# Patient Record
Sex: FEMALE | Race: OTHER | NOT HISPANIC OR LATINO | URBAN - METROPOLITAN AREA
[De-identification: names, ages, dates, MRNs, and addresses within clinical notes are randomized per-mention and may not be internally consistent; named-entity substitution may affect disease eponyms.]

---

## 2024-04-29 NOTE — ASU PATIENT PROFILE, ADULT - FALL HARM RISK - UNIVERSAL INTERVENTIONS
Bed in lowest position, wheels locked, appropriate side rails in place/Call bell, personal items and telephone in reach/Instruct patient to call for assistance before getting out of bed or chair/Non-slip footwear when patient is out of bed/[place] to call system/Physically safe environment - no spills, clutter or unnecessary equipment/Purposeful Proactive Rounding/Room/bathroom lighting operational, light cord in reach

## 2024-04-29 NOTE — ASU PATIENT PROFILE, ADULT - BLOOD AVOIDANCE/RESTRICTIONS, PROFILE
Occupational Therapy Visit    Visit Count: 5    Insurance Information: Medicare **Also receiving occupational therapy for other diagnosis**                Referred by: Susan Barron MD;   Medical Diagnosis (from order):         Diagnosis Information                Diagnosis     596.51 (ICD-9-CM) - N32.81 (ICD-10-CM) - OAB (overactive bladder)788.30   (ICD-9-CM) - R32 (ICD-10-CM) - Incontinence   Episode created from referral [id number]               Treatment Diagnosis: Pelvic floor dysfunction with increased pain/symptoms, impaired posture, impaired strength, impaired activity tolerance, impaired motor function/performance/coordination, impaired body mechanics, impaired bladder health, impaired bowel health, urinary incontinence     Diagnosis Precautions: History of a stroke    SUBJECTIVE   Patient states she has \"good days and bad days. Seem to be doing a little bit better, when I get up, I'm doing the breathing, and waiting a little bit, (do the little gentle contraction), and then it settles down\"  Current Pain (0-10 scale): 0/10      OBJECTIVE   Verbal informed consent was received today from patient for external pelvic floor muscle assessment and treatment.   Educated patient on the different types of pelvic floor muscle assessments = internal, external, biofeedback  • Educated patient on the benefits and risks of each examination technique  • Based on discussion with patient, patient choosing to proceed with external assessment     Outcome/Assessments  Patient Specific Functional Scale:   Activity: Be able to sneeze, cough, walk to the bathroom without leakage, Score: 3  Activity: Decrease use of a pad only (no Depend) for leaking , Score: 1  Activity: Wake 1x or less to void at night, Score: 2  Average Score: 3  Each activity is scored: 0=unable to perform activity to 10=able to perform activity at the same level as before injury or problem    Treatment   Therapeutic Exercise:   Instructed in functional  pelvic floor exercise program:  · Seated Diaphragmatic Breathing with Pelvic Floor Lengthening  - 10 reps  · Seated Pelvic Floor Contract and Release- 10 reps  · Seated Pelvic Floor Stretch - 2 reps - 30 hold  · Seated Piriformis Stretch with Foot on Ground - 2 reps - 30 hold  · Seated Pelvic Floor Contract/Relax - 10 reps  · Seated Pelvic Floor Contraction with Isometric Hip Adduction - 10 reps  · Standing Pelvic Floor Contract/Relax - 10 reps  · Sit to Stand with Pelvic Floor Contraction - 2 reps    Neuromuscular Reeducation:  Discussion with patient regarding use of one hand for self- catheterization due to questions regarding post Botox injection possible side effects of temporary inability to empty the bladder. Discussed one handed techniques for stabilizing hand if this would be needed.   Patient demonstrated the following strategies for bladder emptying:  Initial emptying/void, stand up, move around then sit down again on the toilet and try to urinate again.  Leaning forward, placing elbows onto knees.  Leaning forward (and rocking) to promote urination.  After finished passing urine, squeeze the pelvic floor muscle and then relax it, to try and completely empty, x5  Tapping over the bladder to assist in triggering a contraction    Stroking or tickling the lower back to stimulate urination      Skilled input: verbal instruction/cues, tactile instruction/cues, posture correction, facilitation, inhibition    Home Program:   Ther ex, bladder emptying, relaxation stretches    Writer verbally educated the patient and received verbal consent from the patient on hand placement, positioning of patient, and techniques to be performed today including clothing adjustments for techniques, therapist position for techniques, hand placement and palpation for techniques as described above and how they are pertinent to the patient's plan of care.       Suggestions for next session as indicated: progress per plan of  care    ASSESSMENT   Patient with good activity tolerance for today's session. Patient with improved coordination of PFM with accessory muscles and breathing diaphragm today. Patient demonstrated good comprehension of instructions & rationale. Agreeable to all Home Program recommendations. Patient will continue to benefit from skilled occupational therapy to progress to baseline with pelvic floor health.   Pain after treatment (patient reported, 0-10 scale): 0/10  Result of above outlined education: Verbalizes understanding and Needs reinforcement    Therapy procedure time and total treatment time can be found documented on the Time Entry flowsheet   none

## 2024-04-29 NOTE — ASU PATIENT PROFILE, ADULT - NSICDXPASTSURGICALHX_GEN_ALL_CORE_FT
PAST SURGICAL HISTORY:  No significant past surgical history PAST SURGICAL HISTORY:  History of elective

## 2024-04-29 NOTE — ASU PATIENT PROFILE, ADULT - NS PREOP UNDERSTANDS INFO
Spoke to patient to be NPO/NO solid foods after  12Mn, allow to drink water or apple juice till  4-6 am , dress comfortable, leave all valuable at home, no lotions, no jewelry, Bring ID photo and insurance cards,  escort arranged, address and telephone given to  patient ./yes

## 2024-04-29 NOTE — ASU PATIENT PROFILE, ADULT - BILL OF RIGHTS/ADMISSION INFORMATION PROVIDED TO:
Patient
CCHD Screen [03-18]: Initial  Pre-Ductal SpO2(%): 98  Post-Ductal SpO2(%): 95  SpO2 Difference(Pre MINUS Post): 3  Extremities Used: Right Hand,Right Foot  Result: Passed  Follow up: Normal Screen- (No follow-up needed)

## 2024-04-30 ENCOUNTER — OUTPATIENT (OUTPATIENT)
Dept: OUTPATIENT SERVICES | Facility: HOSPITAL | Age: 34
LOS: 1 days | Discharge: HOME | End: 2024-04-30
Payer: MEDICAID

## 2024-04-30 VITALS
HEART RATE: 61 BPM | HEIGHT: 60 IN | RESPIRATION RATE: 16 BRPM | TEMPERATURE: 98 F | OXYGEN SATURATION: 98 % | WEIGHT: 109.35 LBS | DIASTOLIC BLOOD PRESSURE: 83 MMHG | SYSTOLIC BLOOD PRESSURE: 137 MMHG

## 2024-04-30 VITALS
SYSTOLIC BLOOD PRESSURE: 131 MMHG | OXYGEN SATURATION: 98 % | HEART RATE: 77 BPM | RESPIRATION RATE: 12 BRPM | DIASTOLIC BLOOD PRESSURE: 80 MMHG

## 2024-04-30 DIAGNOSIS — L05.91 PILONIDAL CYST WITHOUT ABSCESS: ICD-10-CM

## 2024-04-30 DIAGNOSIS — Z98.890 OTHER SPECIFIED POSTPROCEDURAL STATES: Chronic | ICD-10-CM

## 2024-04-30 PROCEDURE — 88304 TISSUE EXAM BY PATHOLOGIST: CPT | Mod: 26

## 2024-04-30 RX ORDER — SODIUM CHLORIDE 9 MG/ML
1000 INJECTION, SOLUTION INTRAVENOUS
Refills: 0 | Status: DISCONTINUED | OUTPATIENT
Start: 2024-04-30 | End: 2024-04-30

## 2024-04-30 RX ORDER — DIPHENHYDRAMINE HCL 50 MG
25 CAPSULE ORAL ONCE
Refills: 0 | Status: DISCONTINUED | OUTPATIENT
Start: 2024-04-30 | End: 2024-04-30

## 2024-04-30 RX ORDER — KETOROLAC TROMETHAMINE 30 MG/ML
1 SYRINGE (ML) INJECTION
Qty: 12 | Refills: 0
Start: 2024-04-30 | End: 2024-05-02

## 2024-04-30 RX ORDER — OXYCODONE HYDROCHLORIDE 5 MG/1
5 TABLET ORAL ONCE
Refills: 0 | Status: DISCONTINUED | OUTPATIENT
Start: 2024-04-30 | End: 2024-04-30

## 2024-04-30 RX ORDER — OXYCODONE HYDROCHLORIDE 5 MG/1
1 TABLET ORAL
Qty: 10 | Refills: 0
Start: 2024-04-30 | End: 2024-05-03

## 2024-04-30 NOTE — ASU DISCHARGE PLAN (ADULT/PEDIATRIC) - CARE PROVIDER_API CALL
Naty Ansari [facility]  [address] [place] [address], [address]  [place] [address]  Phone: [phone]  Follow Up Time: 1 week

## 2024-04-30 NOTE — BRIEF OPERATIVE NOTE - OPERATION/FINDINGS
Patient brought to the OR, intubated, proned, prepped and draped. Pilonidal cyst excised. Closed in standard fashion.  Patient brought to the OR, intubated, proned, prepped w/ betadyne and draped. Two pits present which led to cyst cavity. Upon probing pits, pus expressed with pressure to cavity. Sent for culture. Wide excision of pilonidal cyst cavity, which was mostly left of midline with a channel going right of midline at 4:30 o'clock of the wound. Following resection of cyst cavity and debridement of its contents, wound is marsupialized. Packed lightly with moist gauze and covered w/ dry gauze secured w/ tape.

## 2024-04-30 NOTE — ASU DISCHARGE PLAN (ADULT/PEDIATRIC) - ASU DC SPECIAL INSTRUCTIONSFT
FOLLOW UP: Dr. Ansari in 1-2 weeks. Call the office to schedule the appointment.   WOUND CARE: You may shower; soap and water over incision sites. Do not scrub. Pat dry when done.   ACTIVITY: Ambulate as tolerated, but no heavy lifting (>10lbs) or strenuous exercise.  DIET: You may resume regular diet.   Call the office if you experience increasing pain, redness, swelling or drainage from incision sites/wounds, or temperature >101.4F.   NEW MEDICATIONS:  1) Tylenol 500 mg - 1000 mg - Available over the counter. Take by mouth every six hours while you have pain. Available over the counter.   2) Toradol 10 mg - Take by mouth every 6 hours for pain. Do not take motrin/ibuprofen or other medications in the NSAID class while taking this medication. (Discuss with the pharmacist if you have questions about identifying NSAIDs.) After you complete this prescription, take over the counter NSAIDs such as ibuprofen or motrin for pain if needed.   3) Oxycodone 5 mg - Take for severe pain not controlled by the above medications. This medication can be constipating. If you become constipated, take a stool softener or laxative until you are having regular bowel movements.    [facility] [address] [facility] This pharmacy closes at 10pm on 4/30/24. FOLLOW UP: Dr. Ansari in 2 weeks unless you need to be seen sooner. Call the office to schedule the appointment.   WOUND CARE:   - Your OR dressing will remain in place until tomorrow.   - Tomorrow, remove the tape over the dressing. Leave gauze in place.   - Get in the shower. Gently remove the gauze that is in the wound in the water of the shower.   - Some bleeding may occur. Do not be alarmed.   - Repack the wound lightly with gauze. Secure this in place with paper tape.   ACTIVITY: Ambulate as tolerated, but no heavy lifting (>10lbs) or strenuous exercise.  DIET: You may resume regular diet.   Call the office if you experience increasing pain, redness, swelling or drainage from incision sites/wounds, or temperature >101.4F.   NEW MEDICATIONS:  1) Tylenol 500 mg - 1000 mg - Available over the counter. Take by mouth every six hours while you have pain. Available over the counter.   2) Toradol 10 mg - Take by mouth every 6 hours for pain. Do not take motrin/ibuprofen or other medications in the NSAID class while taking this medication. (Discuss with the pharmacist if you have questions about identifying NSAIDs.) After you complete this prescription, take over the counter NSAIDs such as ibuprofen or motrin for pain if needed.   3) Oxycodone 5 mg - Take for severe pain not controlled by the above medications. This medication can be constipating. If you become constipated, take a stool softener or laxative until you are having regular bowel movements.    Please note: Take tylenol and ibuprofen before your follow up visit with Dr. Ansari. You will have sutures removed at that time and this will help with the discomfort.     Prescriptions sent to [facility] on [address] [facility] on 4/30/24. This pharmacy closes at 10pm on 4/30/24. FOLLOW UP: Dr. Ansari in 2 weeks unless you need to be seen sooner. Call the office to schedule the appointment.   WOUND CARE:   - Your operating room dressing will remain in place until tomorrow.   - Tomorrow, remove the tape over the dressing. Leave gauze in place.   - Get in the shower. Gently soak the dressing in the shower. Gently remove the gauze that is in the wound once it is fully saturated.   - Some bleeding may occur. Do not be alarmed.   - Lightly repack the wound with gauze moistened with saline. Cover with dry gauze. Secure this dressing in place with gentle paper tape.   - Continue to dress the wound in this fashion until you are seen by Dr. Ansari.   ACTIVITY: Ambulate as tolerated, but no strenuous exercise.  DIET: You may resume regular diet.   Call the office if you experience increasing pain, redness, swelling or drainage from incision sites/wounds, or temperature >101.4F.   NEW MEDICATIONS:  1) Tylenol 500 - 1000 mg: Available over the counter. Take by mouth every six hours while you have pain.  Max dosing is 1000 mg every six hours or 4000 mg in 24 hours. Do not exceed this dose.   2) Toradol 10 mg - Take by mouth every 6 hours for pain. Do not take motrin/ibuprofen or other medications in the NSAID class while taking this medication. (Discuss with the pharmacist if you have questions about identifying NSAIDs.) After you complete this prescription, take over the counter NSAIDs such as ibuprofen or motrin for pain if needed.   3) Oxycodone 5 mg - Take for severe pain not controlled by the above medications. This medication can be constipating. If you become constipated, take a stool softener or laxative until you are having regular bowel movements.    PLEASE NOTE: Take tylenol and ibuprofen one hour before your follow up visit with Dr. Ansari. You will have sutures removed at that time and doing this this will help with the discomfort of suture removal.     [facility] [address] [address] [facility]. This pharmacy closes at 10pm on 4/30/24.

## 2024-04-30 NOTE — PRE-ANESTHESIA EVALUATION ADULT - NSPROPOSEDPROCEDFT_GEN_ALL_CORE
Nahomy was seen today for a hearing evaluation in consultation for Dr. Steven Hirsch.  Nahomy comes in with her mother who reports concerns that Nahomy may have very sensitive ears due to the fact that she hears every little thing going on in the household and is often complaining that things are too loud.  She goes on to report that whenever Nahomy is wearing her hair down, she tries to stuff it in her ears, to \"block out the noises\".  There are no reports of ear pain, drainage, or chronic infection. Nahomy had a normal hearing screening at birth and there is no family history of early onset hearing loss, other than a maternal uncle who born with congenital ear issues.    Otoscopic examination revealed clear canals, bilaterally.    Tympanometry demonstrated:  RIGHT Ear: Type A; Normal tympanic membrane mobility and normal ear canal volume  LEFT Ear: Type A; Normal tympanic membrane mobility and normal ear canal volume     Speech reception thresholds using a picture board were measured at 10 dB in each ear and consistent with normal hearing.    Pure tone testing for 500-4000 Hz using conditioned play audiometry was also within normal limits, and not in a range considered to be hyper sensitive.    Impression:  Normal hearing    I reviewed the results of today's testing with Nahomy's mother and she is pleased.  They are encouraged to return in the future should any problems or concerns arise.     pylonidal cyst exc

## 2024-05-02 ENCOUNTER — EMERGENCY (EMERGENCY)
Facility: HOSPITAL | Age: 34
LOS: 1 days | Discharge: HOME | End: 2024-05-02
Attending: EMERGENCY MEDICINE | Admitting: EMERGENCY MEDICINE
Payer: MEDICAID

## 2024-05-02 VITALS
OXYGEN SATURATION: 100 % | HEART RATE: 73 BPM | SYSTOLIC BLOOD PRESSURE: 121 MMHG | TEMPERATURE: 99 F | DIASTOLIC BLOOD PRESSURE: 83 MMHG | RESPIRATION RATE: 16 BRPM | HEIGHT: 60 IN | WEIGHT: 139.99 LBS

## 2024-05-02 DIAGNOSIS — R55 SYNCOPE AND COLLAPSE: ICD-10-CM

## 2024-05-02 DIAGNOSIS — Z98.890 OTHER SPECIFIED POSTPROCEDURAL STATES: Chronic | ICD-10-CM

## 2024-05-02 DIAGNOSIS — K14.6 GLOSSODYNIA: ICD-10-CM

## 2024-05-02 PROCEDURE — 99284 EMERGENCY DEPT VISIT MOD MDM: CPT | Mod: 25
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
## 2024-05-03 PROBLEM — Z78.9 OTHER SPECIFIED HEALTH STATUS: Chronic | Status: ACTIVE | Noted: 2024-04-29

## 2024-05-03 LAB
ANION GAP SERPL CALC-SCNC: 11 MMOL/L — SIGNIFICANT CHANGE UP (ref 5–17)
ANISOCYTOSIS BLD QL: SLIGHT — SIGNIFICANT CHANGE UP
BASOPHILS # BLD AUTO: 0 K/UL — SIGNIFICANT CHANGE UP (ref 0–0.2)
BASOPHILS NFR BLD AUTO: 0 % — SIGNIFICANT CHANGE UP (ref 0–2)
BUN SERPL-MCNC: 13 MG/DL — SIGNIFICANT CHANGE UP (ref 7–23)
BURR CELLS BLD QL SMEAR: PRESENT — SIGNIFICANT CHANGE UP
CALCIUM SERPL-MCNC: 9.1 MG/DL — SIGNIFICANT CHANGE UP (ref 8.4–10.5)
CHLORIDE SERPL-SCNC: 107 MMOL/L — SIGNIFICANT CHANGE UP (ref 96–108)
CK SERPL-CCNC: 77 U/L — SIGNIFICANT CHANGE UP (ref 25–170)
CO2 SERPL-SCNC: 24 MMOL/L — SIGNIFICANT CHANGE UP (ref 22–31)
CREAT SERPL-MCNC: 0.69 MG/DL — SIGNIFICANT CHANGE UP (ref 0.5–1.3)
EGFR: 116 ML/MIN/1.73M2 — SIGNIFICANT CHANGE UP
EOSINOPHIL # BLD AUTO: 0.5 K/UL — SIGNIFICANT CHANGE UP (ref 0–0.5)
EOSINOPHIL NFR BLD AUTO: 8.6 % — HIGH (ref 0–6)
GLUCOSE SERPL-MCNC: 92 MG/DL — SIGNIFICANT CHANGE UP (ref 70–99)
HCG SERPL-ACNC: <1 MIU/ML — SIGNIFICANT CHANGE UP
HCT VFR BLD CALC: 38.3 % — SIGNIFICANT CHANGE UP (ref 34.5–45)
HGB BLD-MCNC: 12.6 G/DL — SIGNIFICANT CHANGE UP (ref 11.5–15.5)
HYPOCHROMIA BLD QL: SLIGHT — SIGNIFICANT CHANGE UP
LACTATE SERPL-SCNC: 1.3 MMOL/L — SIGNIFICANT CHANGE UP (ref 0.5–2)
LYMPHOCYTES # BLD AUTO: 3.84 K/UL — HIGH (ref 1–3.3)
LYMPHOCYTES # BLD AUTO: 65.5 % — HIGH (ref 13–44)
MAGNESIUM SERPL-MCNC: 1.9 MG/DL — SIGNIFICANT CHANGE UP (ref 1.6–2.6)
MANUAL SMEAR VERIFICATION: SIGNIFICANT CHANGE UP
MCHC RBC-ENTMCNC: 29.3 PG — SIGNIFICANT CHANGE UP (ref 27–34)
MCHC RBC-ENTMCNC: 32.9 GM/DL — SIGNIFICANT CHANGE UP (ref 32–36)
MCV RBC AUTO: 89.1 FL — SIGNIFICANT CHANGE UP (ref 80–100)
MICROCYTES BLD QL: SLIGHT — SIGNIFICANT CHANGE UP
MONOCYTES # BLD AUTO: 0.25 K/UL — SIGNIFICANT CHANGE UP (ref 0–0.9)
MONOCYTES NFR BLD AUTO: 4.3 % — SIGNIFICANT CHANGE UP (ref 2–14)
NEUTROPHILS # BLD AUTO: 1.27 K/UL — LOW (ref 1.8–7.4)
NEUTROPHILS NFR BLD AUTO: 21.6 % — LOW (ref 43–77)
OVALOCYTES BLD QL SMEAR: SLIGHT — SIGNIFICANT CHANGE UP
PLAT MORPH BLD: NORMAL — SIGNIFICANT CHANGE UP
PLATELET # BLD AUTO: 293 K/UL — SIGNIFICANT CHANGE UP (ref 150–400)
POIKILOCYTOSIS BLD QL AUTO: SIGNIFICANT CHANGE UP
POLYCHROMASIA BLD QL SMEAR: SLIGHT — SIGNIFICANT CHANGE UP
POTASSIUM SERPL-MCNC: 4 MMOL/L — SIGNIFICANT CHANGE UP (ref 3.5–5.3)
POTASSIUM SERPL-SCNC: 4 MMOL/L — SIGNIFICANT CHANGE UP (ref 3.5–5.3)
RBC # BLD: 4.3 M/UL — SIGNIFICANT CHANGE UP (ref 3.8–5.2)
RBC # FLD: 12.8 % — SIGNIFICANT CHANGE UP (ref 10.3–14.5)
RBC BLD AUTO: ABNORMAL
SCHISTOCYTES BLD QL AUTO: SLIGHT — SIGNIFICANT CHANGE UP
SMUDGE CELLS # BLD: PRESENT — SIGNIFICANT CHANGE UP
SODIUM SERPL-SCNC: 142 MMOL/L — SIGNIFICANT CHANGE UP (ref 135–145)
STOMATOCYTES BLD QL SMEAR: SLIGHT — SIGNIFICANT CHANGE UP
TROPONIN T, HIGH SENSITIVITY RESULT: <6 NG/L — SIGNIFICANT CHANGE UP (ref 0–51)
WBC # BLD: 5.87 K/UL — SIGNIFICANT CHANGE UP (ref 3.8–10.5)
WBC # FLD AUTO: 5.87 K/UL — SIGNIFICANT CHANGE UP (ref 3.8–10.5)

## 2024-05-03 PROCEDURE — 80048 BASIC METABOLIC PNL TOTAL CA: CPT

## 2024-05-03 PROCEDURE — 83735 ASSAY OF MAGNESIUM: CPT

## 2024-05-03 PROCEDURE — 99283 EMERGENCY DEPT VISIT LOW MDM: CPT

## 2024-05-03 PROCEDURE — 83605 ASSAY OF LACTIC ACID: CPT

## 2024-05-03 PROCEDURE — 85025 COMPLETE CBC W/AUTO DIFF WBC: CPT

## 2024-05-03 PROCEDURE — 36415 COLL VENOUS BLD VENIPUNCTURE: CPT

## 2024-05-03 PROCEDURE — 84484 ASSAY OF TROPONIN QUANT: CPT

## 2024-05-03 PROCEDURE — 82550 ASSAY OF CK (CPK): CPT

## 2024-05-03 PROCEDURE — 84702 CHORIONIC GONADOTROPIN TEST: CPT

## 2024-05-03 NOTE — ED PROVIDER NOTE - ENMT, MLM
Airway patent, Mouth with normal mucosa. Throat has no vesicles, no oropharyngeal exudates and uvula is midline. no intraoral swelling, no stridor, no trismus, no drooling. mid anterior tongue - laceration

## 2024-05-03 NOTE — ED ADULT NURSE NOTE - NSFALLUNIVINTERV_ED_ALL_ED
Bed/Stretcher in lowest position, wheels locked, appropriate side rails in place/Call bell, personal items and telephone in reach/Instruct patient to call for assistance before getting out of bed/chair/stretcher/Non-slip footwear applied when patient is off stretcher/[place] to call system/Physically safe environment - no spills, clutter or unnecessary equipment/Purposeful proactive rounding/Room/bathroom lighting operational, light cord in reach

## 2024-05-03 NOTE — ED PROVIDER NOTE - PATIENT PORTAL LINK FT
You can access the FollowMyHealth Patient Portal offered by [facility] by registering at the following website: [email or web address]. By joining [email or web address] FollowMyHealth portal, you will also be able to view your health information using other applications (apps) compatible with our system.

## 2024-05-03 NOTE — ED PROVIDER NOTE - NSFOLLOWUPINSTRUCTIONS_ED_ALL_ED_FT
Follow-up with neurology    Please reach out to Anjelica Deal ([facility] ED clinical referral coordinator) to assist you with your follow-up appointment.     Monday - Friday 11am-7pm  [phone]  [email or web address]    Syncope, Adult    Syncope is when you pass out or faint for a short time. It is caused by a sudden decrease in blood flow to the brain. This can happen for many reasons. It can sometimes happen when seeing blood, getting a shot (injection), or having pain or strong emotions. Most causes of fainting are not dangerous, but in some cases it can be a sign of a serious medical problem.    If you faint, get help right away. Call your local emergency services (911 in the [place]    Follow these instructions at home:  Watch for any changes in your symptoms. Take these actions to stay safe and help with your symptoms:    Knowing when you may be about to faint    Signs that you may be about to faint include:  Feeling dizzy or light-headed. It may feel like the room is spinning.  Feeling weak.  Feeling like you may vomit (nauseous).  Seeing spots or seeing all white or all black.  Having cold, clammy skin.  Feeling warm and sweaty.  Hearing ringing in the ears.  If you start to feel like you might faint, sit or lie down right away. If sitting, lower your head down between your legs. If lying down, raise (elevate) your feet above the level of your heart.  Breathe deeply and steadily. Wait until all of the symptoms are gone.  Have someone stay with you until you feel better.    Medicines    Take over-the-counter and prescription medicines only as told by your doctor.  If you are taking blood pressure or heart medicine, sit up and stand up slowly. Spend a few minutes getting ready to sit and then stand. This can help you feel less dizzy.    Lifestyle    Do not drive, use machinery, or play sports until your doctor says it is okay.  Do not drink alcohol.  Do not smoke or use any products that contain nicotine or tobacco. If you need help quitting, ask your doctor.  Avoid hot tubs and saunas.    General instructions    Talk with your doctor about your symptoms. You may need to have testing to help find the cause.  Drink enough fluid to keep your pee (urine) pale yellow.  Avoid standing for a long time. If you must stand for a long time, do movements such as:  Moving your legs.  Crossing your legs.  Flexing and stretching your leg muscles.  Squatting.  Keep all follow-up visits.    Contact a doctor if:  You have episodes of near fainting.  Get help right away if:  You pass out or faint.  You hit your head or are injured after fainting.  You have any of these symptoms:  Fast or uneven heartbeats (palpitations).  Pain in your chest, belly, or back.  Shortness of breath.  You have jerky movements that you cannot control (seizure).  You have a very bad headache.  You are confused.  You have problems with how you see (vision).  You are very weak.  You have trouble walking.  You are bleeding from your mouth or your butt (rectum).  You have black or tarry poop (stool).  These symptoms may be an emergency. Get help right away. Call your local emergency services (911 in the [place]  Do not wait to see if the symptoms will go away.  Do not drive yourself to the hospital.    Summary  Syncope is when you pass out or faint for a short time. It is caused by a sudden decrease in blood flow to the brain.  Signs that you may be about to faint include feeling dizzy or light-headed, feeling like you may vomit, seeing all white or all black, or having cold, clammy skin.  If you start to feel like you might faint, sit or lie down right away. Lower your head if sitting, or raise (elevate) your feet if lying down. Breathe deeply and steadily. Wait until all of the symptoms are gone.  This information is not intended to replace advice given to you by your health care provider. Make sure you discuss any questions you have with your health care provider.    Seizure, Adult  A seizure is a sudden burst of abnormal electrical and chemical activity in the brain. Seizures usually last from 30 seconds to 2 minutes.    What are the causes?  Common causes of this condition include:  Fever or infection.  Problems that affect the brain. These may include:  A brain or head injury.  Bleeding in the brain.  A brain tumor.  Low levels of blood sugar or salt.  Kidney problems or liver problems.  Conditions that are passed from parent to child (are inherited).  Problems with a substance, such as:  Having a reaction to a drug or a medicine.  Stopping the use of a substance all of a sudden (withdrawal).  A stroke.  Disorders that affect how you develop.  Sometimes, the cause may not be known.    What increases the risk?  Having someone in your family who has epilepsy. In this condition, seizures happen again and again over time. They have no clear cause.  Having had a tonic–clonic seizure before. This type of seizure causes you to:  Tighten the muscles of the whole body.  Lose consciousness.  Having had a head injury or strokes before.  Having had a lack of oxygen at birth.    What are the signs or symptoms?  There are many types of seizures. The symptoms vary depending on the type of seizure you have.    Symptoms during a seizure    Shaking that you cannot control (convulsions) with fast, jerky movements of muscles.  Stiffness of the body.  Breathing problems.  Feeling mixed up (confused).  Staring or not responding to sound or touch.  Head nodding.  Eyes that blink, flutter, or move fast.  Drooling, grunting, or making clicking sounds with your mouth  Losing control of when you pee or poop.    Symptoms before a seizure    Feeling afraid, nervous, or worried.  Feeling like you may vomit.  Feeling like:  You are moving when you are not.  Things around you are moving when they are not.  Feeling like you saw or heard something before (déjà vu).  Odd tastes or smells.  Changes in how you see. You may see flashing lights or spots.    Symptoms after a seizure    Feeling confused.  Feeling sleepy.  Headache.  Sore muscles.    How is this treated?  If your seizure stops on its own, you will not need treatment. If your seizure lasts longer than 5 minutes, you will normally need treatment. Treatment may include:  Medicines given through an IV tube.  Avoiding things, such as medicines, that are known to cause your seizures.  Medicines to prevent seizures.  A device to prevent or control seizures.  Surgery.  A diet low in carbohydrates and high in fat (ketogenic diet).  Follow these instructions at home:    Medicines    Take over-the-counter and prescription medicines only as told by your doctor.  Avoid foods or drinks that may keep your medicine from working, such as alcohol.    Activity    Follow instructions about driving, swimming, or doing things that would be dangerous if you had another seizure. Wait until your doctor says it is safe for you to do these things.  If you live in the [place] ask your local department of [employer] when you can drive.  Get a lot of rest.    Teaching others    Teach friends and family what to do when you have a seizure. They should:  Help you get down to the ground.  Protect your head and body.  Loosen any clothing around your neck.  Turn you on your side.  Know whether or not you need emergency care.  Stay with you until you are better.  Also, tell them what not to do if you have a seizure. Tell them:  They should not hold you down.  They should not put anything in your mouth.    General instructions    Avoid anything that gives you seizures.  Keep a seizure diary. Write down:  What you remember about each seizure.  What you think caused each seizure.  Keep all follow-up visits.    Contact a doctor if:  You have another seizure or seizures. Call the doctor each time you have a seizure.  The pattern of your seizures changes.  You keep having seizures with treatment.  You have symptoms of being sick or having an infection.  You are not able to take your medicine.    Get help right away if:  You have any of these problems:  A seizure that lasts longer than 5 minutes.  Many seizures in a row and you do not feel better between seizures.  A seizure that makes it harder to breathe.  A seizure and you can no longer speak or use part of your body.  You do not wake up right after a seizure.  You get hurt during a seizure.  You feel confused or have pain right after a seizure.  These symptoms may be an emergency. Get help right away. Call your local emergency services (911 in the [place]  Do not wait to see if the symptoms will go away.  Do not drive yourself to the hospital.    Summary  A seizure is a sudden burst of abnormal electrical and chemical activity in the brain. Seizures normally last from 30 seconds to 2 minutes.  Causes of seizures include illness, injury to the head, low levels of blood sugar or salt, and certain conditions.  Most seizures will stop on their own in less than 5 minutes. Seizures that last longer than 5 minutes are a medical emergency and need treatment right away.  Many medicines are used to treat seizures. Take over-the-counter and prescription medicines only as told by your doctor.  This information is not intended to replace advice given to you by your health care provider. Make sure you discuss any questions you have with your health care provider.
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
## 2024-05-06 LAB
CULTURE RESULTS: ABNORMAL
SPECIMEN SOURCE: SIGNIFICANT CHANGE UP

## 2024-05-07 LAB
CULTURE RESULTS: ABNORMAL
SPECIMEN SOURCE: SIGNIFICANT CHANGE UP
SURGICAL PATHOLOGY STUDY: SIGNIFICANT CHANGE UP
